# Patient Record
Sex: MALE | Race: OTHER | HISPANIC OR LATINO | URBAN - METROPOLITAN AREA
[De-identification: names, ages, dates, MRNs, and addresses within clinical notes are randomized per-mention and may not be internally consistent; named-entity substitution may affect disease eponyms.]

---

## 2022-06-02 ENCOUNTER — EMERGENCY (EMERGENCY)
Facility: HOSPITAL | Age: 42
LOS: 1 days | Discharge: HOME | End: 2022-06-02
Attending: EMERGENCY MEDICINE
Payer: SELF-PAY

## 2022-06-02 VITALS
WEIGHT: 212.08 LBS | HEART RATE: 73 BPM | RESPIRATION RATE: 18 BRPM | DIASTOLIC BLOOD PRESSURE: 79 MMHG | SYSTOLIC BLOOD PRESSURE: 128 MMHG | OXYGEN SATURATION: 97 % | TEMPERATURE: 98 F

## 2022-06-02 PROCEDURE — 99220: CPT

## 2022-06-02 PROCEDURE — 72131 CT LUMBAR SPINE W/O DYE: CPT | Mod: 26,MA

## 2022-06-02 RX ORDER — DIAZEPAM 5 MG
2 TABLET ORAL
Refills: 0 | Status: COMPLETED | OUTPATIENT
Start: 2022-06-02 | End: 2022-06-09

## 2022-06-02 RX ORDER — LIDOCAINE 4 G/100G
1 CREAM TOPICAL
Qty: 10 | Refills: 0
Start: 2022-06-02 | End: 2022-06-06

## 2022-06-02 RX ORDER — IBUPROFEN 200 MG
1 TABLET ORAL
Qty: 42 | Refills: 0
Start: 2022-06-02 | End: 2022-06-08

## 2022-06-02 RX ORDER — IBUPROFEN 200 MG
600 TABLET ORAL EVERY 6 HOURS
Refills: 0 | Status: DISCONTINUED | OUTPATIENT
Start: 2022-06-02 | End: 2022-06-06

## 2022-06-02 RX ORDER — METHOCARBAMOL 500 MG/1
1 TABLET, FILM COATED ORAL
Qty: 30 | Refills: 0
Start: 2022-06-02 | End: 2022-06-11

## 2022-06-02 RX ORDER — ACETAMINOPHEN 500 MG
2 TABLET ORAL
Qty: 120 | Refills: 0
Start: 2022-06-02 | End: 2022-06-11

## 2022-06-02 RX ORDER — CYCLOBENZAPRINE HYDROCHLORIDE 10 MG/1
10 TABLET, FILM COATED ORAL ONCE
Refills: 0 | Status: COMPLETED | OUTPATIENT
Start: 2022-06-02 | End: 2022-06-02

## 2022-06-02 RX ORDER — GABAPENTIN 400 MG/1
300 CAPSULE ORAL THREE TIMES A DAY
Refills: 0 | Status: DISCONTINUED | OUTPATIENT
Start: 2022-06-02 | End: 2022-06-06

## 2022-06-02 RX ORDER — LIDOCAINE 4 G/100G
1 CREAM TOPICAL ONCE
Refills: 0 | Status: COMPLETED | OUTPATIENT
Start: 2022-06-02 | End: 2022-06-02

## 2022-06-02 RX ORDER — LIDOCAINE 4 G/100G
1 CREAM TOPICAL EVERY 24 HOURS
Refills: 0 | Status: DISCONTINUED | OUTPATIENT
Start: 2022-06-02 | End: 2022-06-06

## 2022-06-02 RX ORDER — IBUPROFEN 200 MG
600 TABLET ORAL ONCE
Refills: 0 | Status: COMPLETED | OUTPATIENT
Start: 2022-06-02 | End: 2022-06-02

## 2022-06-02 RX ORDER — ACETAMINOPHEN 500 MG
650 TABLET ORAL EVERY 6 HOURS
Refills: 0 | Status: DISCONTINUED | OUTPATIENT
Start: 2022-06-02 | End: 2022-06-06

## 2022-06-02 RX ADMIN — LIDOCAINE 1 PATCH: 4 CREAM TOPICAL at 11:58

## 2022-06-02 RX ADMIN — GABAPENTIN 300 MILLIGRAM(S): 400 CAPSULE ORAL at 22:18

## 2022-06-02 RX ADMIN — CYCLOBENZAPRINE HYDROCHLORIDE 10 MILLIGRAM(S): 10 TABLET, FILM COATED ORAL at 11:59

## 2022-06-02 RX ADMIN — Medication 650 MILLIGRAM(S): at 23:32

## 2022-06-02 RX ADMIN — Medication 650 MILLIGRAM(S): at 22:18

## 2022-06-02 RX ADMIN — Medication 2 MILLIGRAM(S): at 18:21

## 2022-06-02 RX ADMIN — Medication 600 MILLIGRAM(S): at 11:59

## 2022-06-02 RX ADMIN — Medication 600 MILLIGRAM(S): at 18:56

## 2022-06-02 RX ADMIN — Medication 600 MILLIGRAM(S): at 18:21

## 2022-06-02 NOTE — ED PROVIDER NOTE - ATTENDING CONTRIBUTION TO CARE
41yom no sig pmh with back pain x 2 days since falling from 12 foot ladder. Drill gave out on him causing him to fall backwards, hitting ladder on the way down. did not have head injury. denies loc. not on blood thinners. pain is only in lumbar region of back. he denies bowel, bladder incontinence.  AP - ambulatory. tender in midline lumbar region. check CT, pain control. reassess

## 2022-06-02 NOTE — ED PROVIDER NOTE - CCCP TRG CHIEF CMPLNT
Patient reports headache 8/10. Tylenol ineffective. Dany Guerra NP notified. Order received for Ibuprofen 600mg once. fall from height

## 2022-06-02 NOTE — ED CDU PROVIDER INITIAL DAY NOTE - PROGRESS NOTE DETAILS
PT with acute spinal Fx  placed in obs for Diagnostic uncertainty. PT seen BY OBS PA found to be appropriate CDU PT care transferred to PA.

## 2022-06-02 NOTE — ED PROVIDER NOTE - NS ED ROS FT
Review of Systems  CONSTITUTIONAL: afebrile w/no diaphoresis or weight changes  SKIN: warm, dry w/ no rash or bleeding  EYES: no changes to vision  ENT: no changes in hearing, no sore throat  RESPIRATORY: no cough or SOB  CARDIAC: no chest pain & no palpitations  GI: no abd pain, nausea, vomiting, diarrhea, constipation, or blood in stool/carrie blood  GENITO-URINARY: no discharge, dysuria or hematuria,   MUSCULOSKELETAL:  RIGHT HIP AND RIGHT LOW BACK PAIN  NEUROLOGIC: no weakness, headache, anesthesia or paresthesias  PSYCH: no anxiety, non suicidal, non homicidal, without hallucinations or depression

## 2022-06-02 NOTE — ED PROVIDER NOTE - OBJECTIVE STATEMENT
HENRI CALDWELL is a 42yo ambulated in c/o BACK PAIN that started Tuesday after he fell from a 12 foot ladder. States he was at the top of the ladder drilling into the side of a house when the ladder slid out from underneath him and he fell on his back, landing on the ladder. He was able to get up on his own and was ambulatory at the scene but states he was in severe pain. He states the pain is getting worse and kept him up last night prompting him to come in. He says the pain is in his right low back and radiates to his right leg and is a/w numbness on the right lateral thigh. He denies bowel, bladder incontinence.

## 2022-06-02 NOTE — CONSULT NOTE ADULT - ASSESSMENT
ASSESSMENT  41M no PMH s/p mechanical fall off ladder, -LOC, HS, or AC/AP, found to have L2-L3 TP fractures.      PLAN  - case d/w team  - no acute neurosurgical intervention indicated at this time  - pain control as needed  - recommend MR L spine, brace/dispo. pending results  - will continue to follow ASSESSMENT  41M no PMH s/p mechanical fall off ladder, -LOC, HS, or AC/AP, found to have L2-L3 TP fractures.      PLAN  - case d/w team  - no acute neurosurgical intervention indicated at this time  - pain control as needed  - recommend MR L spine, brace/dispo. pending results  - recommend trauma eval.  - will continue to follow

## 2022-06-02 NOTE — ED CDU PROVIDER INITIAL DAY NOTE - MEDICAL DECISION MAKING DETAILS
41yom no sig pmh with back pain x 2 days since falling from 12 foot ladder. Drill gave out on him causing him to fall backwards, hitting ladder on the way down. did not have head injury. denies loc. not on blood thinners. pain is only in lumbar region of back. he denies bowel, bladder incontinence.  AP - ambulatory. tender in midline lumbar region. CT with transverse process fx in L2/L3, seen by spine/neurosurg plan for MR and obs

## 2022-06-02 NOTE — ED PROVIDER NOTE - CLINICAL SUMMARY MEDICAL DECISION MAKING FREE TEXT BOX
ASSESSMENT:   MARCELLE CALDWELL is a 40yo M who presented with BACK PAIN after falling from height. A/w anesthesias. No incontinence. PE w/ pain but full ROM    Concerning for msk vs radiculopathy.     PLAN: CT to r/o fracture, pain control, supportive care.     Medications:  cyclobenzaprine  lidocaine   4% Patch  ibuprofen  Tablet.    Studies:  CT Lumbar Spine No Cont

## 2022-06-02 NOTE — ED ADULT NURSE NOTE - OBJECTIVE STATEMENT
Pt comes complaining of back pain after falling off a ladder. Pt states he fell from 12 feet and landed on top of the ladder. Pt currently complaining of lower back pain, and is having difficulty standing and walking.

## 2022-06-02 NOTE — ED CDU PROVIDER INITIAL DAY NOTE - OBJECTIVE STATEMENT
HENRI CALDWELL is a 40yo ambulated in c/o BACK PAIN that started Tuesday after he fell from a 12 foot ladder. States he was at the top of the ladder drilling into the side of a house when the ladder slid out from underneath him and he fell on his back, landing on the ladder. He was able to get up on his own and was ambulatory at the scene but states he was in severe pain. He states the pain is getting worse and kept him up last night prompting him to come in. He says the pain is in his right low back and radiates to his right leg and is a/w numbness on the right lateral thigh. He denies bowel, bladder incontinence.

## 2022-06-02 NOTE — ED PROVIDER NOTE - PHYSICAL EXAMINATION
VITAL SIGNS: I have reviewed vital signs  CONSTITUTIONAL:  in no acute distress.  SKIN: Warm, dry, no rash.  HEAD: Normocephalic, atraumatic.  EYES: PERRL, conjunctiva and sclera clear.  ENT: pink & moist mucosa, no pharyngeal erythema  NECK: Supple, non tender. No cervical lymphadenopathy.  CARD: Regular rate and rhythm. No murmurs.   RESP: No wheezes, rales or rhonchi.   ABD:  soft, non-distended, non-tender.   MSK:  NO BONY DEFORMITIES IN SPINE, HIP, THIGH. TTP IN LEFT PARA SPINALS AND LEFT HIP. NO MIDLINE SPINAL TENDERNESS. FULL ROM IN LOW BACK, HIP BUT W/ PAIN. PT AMBULATES W/ PAIN.   NEURO: Alert, oriented. Grossly unremarkable. No focal deficits.   PSYCH: Cooperative, alert & oriented x3

## 2022-06-02 NOTE — CONSULT NOTE ADULT - SUBJECTIVE AND OBJECTIVE BOX
Patient is a 41y old Male who presents with a chief complaint of back pain.  HPI: 41M denies any PMH, presented to ED after falling off a 12 foot ladder on Tuesday. States he was drilling a hole and slipped off the ladder & then landed on his side on top of it. Denies head strike, LOC, AC/AP use. Patient tried taking Tylenol at home for pain without relief, prompting him to seek medical attention. Patient states the pain is localized to his lower back, on the left side, radiating down the buttocks to the lateral thigh, associated with numbness, denies tingling or weakness. Denies urinary/bowel incontinence or retention, saddle anaesthesia CT revealed L2/L3 transverse process fractures.    PAST MEDICAL & SURGICAL HISTORY: Denies    SOCIAL HISTORY:  Tobacco Use: Previous smoker- quit 1 month ago  EtOH use: Socially  Substance: Denies    Allergies  No Known Allergies    REVIEW OF SYSTEMS  Negative except as noted in HPI    HOME MEDICATIONS: None    Vital Signs Last 24 Hrs  T(C): 36.8 (02 Jun 2022 10:47), Max: 36.8 (02 Jun 2022 10:47)  T(F): 98.3 (02 Jun 2022 10:47), Max: 98.3 (02 Jun 2022 10:47)  HR: 73 (02 Jun 2022 10:47) (73 - 73)  BP: 128/79 (02 Jun 2022 10:47) (128/79 - 128/79)  BP(mean): --  RR: 18 (02 Jun 2022 10:47) (18 - 18)  SpO2: 97% (02 Jun 2022 10:47) (97% - 97%)    PHYSICAL EXAM:  GENERAL: NAD, well-groomed  HEAD:  Atraumatic, normocephalic  MENTAL STATUS: AAO x3; Awake. Opens eyes spontaneously. Appropriately conversant without aphasia, following simple commands.  CRANIAL NERVES: Visual acuity normal for age, visual fields full to confrontation, PERRL. EOMI without nystagmus. Facial sensation intact V1-3 distribution b/l. Face symmetric w/ normal eye closure and smile, tongue midline. Hearing grossly intact. Speech clear. Head turning and shoulder shrug intact.   MOTOR: b/l UE 5/5, RLE 5/5, LLE difficult to assess secondary to pain _ AG, HF 5/5, PF/DF 5/5  SENSATION: grossly intact to light touch all extremities    RADIOLOGY & ADDITIONAL STUDIES:  < from: CT Lumbar Spine No Cont (06.02.22 @ 14:34) >  IMPRESSION:  1.  Transitional lumbosacral vertebral anatomy. There are 6   square-shaped, lumbar type nonrib-bearing vertebral bodies. For the   purposes of this dictation, there is a lumbarized S1.  2.  Acute fractures of the left L2 and L3 transverse processes.    < end of copied text >      CAPRINI SCORE [CLOT]:  Patient has an estimated Caprini score of greater than 5.  However, the patient's unique clinical situation will be addressed in an individual manner to determine appropriate anticoagulation treatment, if any. Patient is a 41y old Male who presents with a chief complaint of back pain.  HPI: 41M denies any PMH, presented to ED after falling off a 12 foot ladder on Tuesday. States he was drilling a hole and slipped off the ladder & then landed on his side on top of it. Denies head strike, LOC, AC/AP use. Patient tried taking Tylenol at home for pain without relief, prompting him to seek medical attention. Patient states the pain is localized to his lower back, on the left side, radiating down the buttocks to the lateral thigh, associated with numbness, denies tingling or weakness. Also admits to L hip pain. Denies urinary/bowel incontinence or retention, saddle anaesthesia CT revealed L2/L3 transverse process fractures.    PAST MEDICAL & SURGICAL HISTORY: Denies    SOCIAL HISTORY:  Tobacco Use: Previous smoker- quit 1 month ago  EtOH use: Socially  Substance: Denies    Allergies  No Known Allergies    REVIEW OF SYSTEMS  Negative except as noted in HPI    HOME MEDICATIONS: None    Vital Signs Last 24 Hrs  T(C): 36.8 (02 Jun 2022 10:47), Max: 36.8 (02 Jun 2022 10:47)  T(F): 98.3 (02 Jun 2022 10:47), Max: 98.3 (02 Jun 2022 10:47)  HR: 73 (02 Jun 2022 10:47) (73 - 73)  BP: 128/79 (02 Jun 2022 10:47) (128/79 - 128/79)  BP(mean): --  RR: 18 (02 Jun 2022 10:47) (18 - 18)  SpO2: 97% (02 Jun 2022 10:47) (97% - 97%)    PHYSICAL EXAM:  GENERAL: NAD, well-groomed  HEAD:  Atraumatic, normocephalic  MENTAL STATUS: AAO x3; Awake. Opens eyes spontaneously. Appropriately conversant without aphasia, following simple commands.  CRANIAL NERVES: Visual acuity normal for age, visual fields full to confrontation, PERRL. EOMI without nystagmus. Facial sensation intact V1-3 distribution b/l. Face symmetric w/ normal eye closure and smile, tongue midline. Hearing grossly intact. Speech clear. Head turning and shoulder shrug intact.   MOTOR: b/l UE 5/5, RLE 5/5, LLE difficult to assess secondary to pain _ AG, HF 5/5, PF/DF 5/5  SENSATION: grossly intact to light touch all extremities    RADIOLOGY & ADDITIONAL STUDIES:  < from: CT Lumbar Spine No Cont (06.02.22 @ 14:34) >  IMPRESSION:  1.  Transitional lumbosacral vertebral anatomy. There are 6   square-shaped, lumbar type nonrib-bearing vertebral bodies. For the   purposes of this dictation, there is a lumbarized S1.  2.  Acute fractures of the left L2 and L3 transverse processes.    < end of copied text >      CAPRINI SCORE [CLOT]:  Patient has an estimated Caprini score of greater than 5.  However, the patient's unique clinical situation will be addressed in an individual manner to determine appropriate anticoagulation treatment, if any.

## 2022-06-03 LAB
ALBUMIN SERPL ELPH-MCNC: 4.6 G/DL — SIGNIFICANT CHANGE UP (ref 3.3–5.2)
ALP SERPL-CCNC: 77 U/L — SIGNIFICANT CHANGE UP (ref 40–120)
ALT FLD-CCNC: 61 U/L — HIGH
ANION GAP SERPL CALC-SCNC: 11 MMOL/L — SIGNIFICANT CHANGE UP (ref 5–17)
APTT BLD: 33.3 SEC — SIGNIFICANT CHANGE UP (ref 27.5–35.5)
AST SERPL-CCNC: 49 U/L — HIGH
BASOPHILS # BLD AUTO: 0.02 K/UL — SIGNIFICANT CHANGE UP (ref 0–0.2)
BASOPHILS NFR BLD AUTO: 0.4 % — SIGNIFICANT CHANGE UP (ref 0–2)
BILIRUB SERPL-MCNC: 0.5 MG/DL — SIGNIFICANT CHANGE UP (ref 0.4–2)
BUN SERPL-MCNC: 17.2 MG/DL — SIGNIFICANT CHANGE UP (ref 8–20)
CALCIUM SERPL-MCNC: 9.8 MG/DL — SIGNIFICANT CHANGE UP (ref 8.6–10.2)
CHLORIDE SERPL-SCNC: 102 MMOL/L — SIGNIFICANT CHANGE UP (ref 98–107)
CO2 SERPL-SCNC: 27 MMOL/L — SIGNIFICANT CHANGE UP (ref 22–29)
CREAT SERPL-MCNC: 1.03 MG/DL — SIGNIFICANT CHANGE UP (ref 0.5–1.3)
EGFR: 94 ML/MIN/1.73M2 — SIGNIFICANT CHANGE UP
EOSINOPHIL # BLD AUTO: 0.12 K/UL — SIGNIFICANT CHANGE UP (ref 0–0.5)
EOSINOPHIL NFR BLD AUTO: 2.5 % — SIGNIFICANT CHANGE UP (ref 0–6)
GLUCOSE SERPL-MCNC: 135 MG/DL — HIGH (ref 70–99)
HCT VFR BLD CALC: 44 % — SIGNIFICANT CHANGE UP (ref 39–50)
HGB BLD-MCNC: 14.7 G/DL — SIGNIFICANT CHANGE UP (ref 13–17)
IMM GRANULOCYTES NFR BLD AUTO: 0.2 % — SIGNIFICANT CHANGE UP (ref 0–1.5)
INR BLD: 1.01 RATIO — SIGNIFICANT CHANGE UP (ref 0.88–1.16)
LYMPHOCYTES # BLD AUTO: 2.04 K/UL — SIGNIFICANT CHANGE UP (ref 1–3.3)
LYMPHOCYTES # BLD AUTO: 41.9 % — SIGNIFICANT CHANGE UP (ref 13–44)
MCHC RBC-ENTMCNC: 30.7 PG — SIGNIFICANT CHANGE UP (ref 27–34)
MCHC RBC-ENTMCNC: 33.4 GM/DL — SIGNIFICANT CHANGE UP (ref 32–36)
MCV RBC AUTO: 91.9 FL — SIGNIFICANT CHANGE UP (ref 80–100)
MONOCYTES # BLD AUTO: 0.31 K/UL — SIGNIFICANT CHANGE UP (ref 0–0.9)
MONOCYTES NFR BLD AUTO: 6.4 % — SIGNIFICANT CHANGE UP (ref 2–14)
NEUTROPHILS # BLD AUTO: 2.37 K/UL — SIGNIFICANT CHANGE UP (ref 1.8–7.4)
NEUTROPHILS NFR BLD AUTO: 48.6 % — SIGNIFICANT CHANGE UP (ref 43–77)
PLATELET # BLD AUTO: 232 K/UL — SIGNIFICANT CHANGE UP (ref 150–400)
POTASSIUM SERPL-MCNC: 4.2 MMOL/L — SIGNIFICANT CHANGE UP (ref 3.5–5.3)
POTASSIUM SERPL-SCNC: 4.2 MMOL/L — SIGNIFICANT CHANGE UP (ref 3.5–5.3)
PROT SERPL-MCNC: 7.7 G/DL — SIGNIFICANT CHANGE UP (ref 6.6–8.7)
PROTHROM AB SERPL-ACNC: 11.7 SEC — SIGNIFICANT CHANGE UP (ref 10.5–13.4)
RBC # BLD: 4.79 M/UL — SIGNIFICANT CHANGE UP (ref 4.2–5.8)
RBC # FLD: 13.2 % — SIGNIFICANT CHANGE UP (ref 10.3–14.5)
SARS-COV-2 RNA SPEC QL NAA+PROBE: SIGNIFICANT CHANGE UP
SODIUM SERPL-SCNC: 140 MMOL/L — SIGNIFICANT CHANGE UP (ref 135–145)
WBC # BLD: 4.87 K/UL — SIGNIFICANT CHANGE UP (ref 3.8–10.5)
WBC # FLD AUTO: 4.87 K/UL — SIGNIFICANT CHANGE UP (ref 3.8–10.5)

## 2022-06-03 PROCEDURE — 72148 MRI LUMBAR SPINE W/O DYE: CPT | Mod: 26,MA

## 2022-06-03 PROCEDURE — 99220: CPT

## 2022-06-03 PROCEDURE — 99226: CPT

## 2022-06-03 RX ADMIN — LIDOCAINE 1 PATCH: 4 CREAM TOPICAL at 16:56

## 2022-06-03 RX ADMIN — Medication 600 MILLIGRAM(S): at 17:29

## 2022-06-03 RX ADMIN — Medication 600 MILLIGRAM(S): at 09:43

## 2022-06-03 RX ADMIN — Medication 600 MILLIGRAM(S): at 19:26

## 2022-06-03 RX ADMIN — Medication 2 MILLIGRAM(S): at 17:29

## 2022-06-03 RX ADMIN — Medication 650 MILLIGRAM(S): at 15:50

## 2022-06-03 RX ADMIN — GABAPENTIN 300 MILLIGRAM(S): 400 CAPSULE ORAL at 13:47

## 2022-06-03 RX ADMIN — Medication 650 MILLIGRAM(S): at 05:35

## 2022-06-03 RX ADMIN — LIDOCAINE 1 PATCH: 4 CREAM TOPICAL at 05:34

## 2022-06-03 RX ADMIN — Medication 600 MILLIGRAM(S): at 05:35

## 2022-06-03 RX ADMIN — LIDOCAINE 1 PATCH: 4 CREAM TOPICAL at 09:43

## 2022-06-03 RX ADMIN — Medication 650 MILLIGRAM(S): at 16:55

## 2022-06-03 RX ADMIN — Medication 2 MILLIGRAM(S): at 05:35

## 2022-06-03 RX ADMIN — Medication 650 MILLIGRAM(S): at 09:43

## 2022-06-03 RX ADMIN — Medication 600 MILLIGRAM(S): at 12:15

## 2022-06-03 RX ADMIN — Medication 600 MILLIGRAM(S): at 15:25

## 2022-06-03 RX ADMIN — GABAPENTIN 300 MILLIGRAM(S): 400 CAPSULE ORAL at 23:02

## 2022-06-03 RX ADMIN — GABAPENTIN 300 MILLIGRAM(S): 400 CAPSULE ORAL at 05:35

## 2022-06-03 NOTE — PROGRESS NOTE ADULT - SUBJECTIVE AND OBJECTIVE BOX
INTERVAL HPI/OVERNIGHT EVENTS:  40 y/o male w/ no known PMHx, presents to [facility] s/p fall from 12 foot ladder, + c/o lower back pain, CT revealed L2/L3 transverse process fractures, NSG called to evaluate. Pt seen and evaluated this morning while sitting on side in bed.       Vital Signs Last 24 Hrs  T(C): 36.6 (03 Jun 2022 05:39), Max: 36.9 (02 Jun 2022 19:47)  T(F): 97.8 (03 Jun 2022 05:39), Max: 98.5 (02 Jun 2022 19:47)  HR: 64 (03 Jun 2022 05:39) (52 - 76)  BP: 112/64 (03 Jun 2022 05:39) (108/62 - 147/79)  BP(mean): --  RR: 18 (03 Jun 2022 05:39) (18 - 18)  SpO2: 98% (03 Jun 2022 05:39) (97% - 98%)      PHYSICAL EXAM:  GENERAL: NAD, well-groomed, well-developed  HEAD:  Atraumatic, normocephalic  DRAINS:   WOUND: Dressing clean dry intact  MENTAL STATUS: AAO x3; Awake/Comatose; Opens eyes spontaneously/to voice/to light touch/to noxious stimuli; Appropriately conversant without aphasia/Nonverbal; following simple commands/mimicking/not following commands  CRANIAL NERVES: PERRL; EOMI; corneals intact b/l; Dolls sign positive; blinks to threat b/l; no facial asymmetry; facial sensation grossly intact to light touch b/l;  tongue midline; palate rises symmetrically; gag reflex intact  MOTOR: strength 5/5 B/L upper and lower extremities; sensation grossly intact all extremities; DTRs 2+ intact and symmetric; negative Pitts's b/l; negative clonus b/l  CHEST/LUNG: Clear to auscultation bilaterally; no rales, rhonchi, wheezing, or rubs  HEART: +S1/+S2; Regular rate and rhythm; no murmurs, rubs, or gallops  ABDOMEN: Soft, nontender, nondistended; bowel sounds present all four quadrants  EXTREMITIES:  2+ peripheral pulses, no clubbing, cyanosis, or edema  SKIN: Warm, dry; no rashes or lesions      RADIOLOGY & ADDITIONAL TESTS:   INTERVAL HPI/OVERNIGHT EVENTS:  40 y/o male w/ no known PMHx, presents to [facility] s/p fall from 12 foot ladder, + c/o lower back pain, CT revealed L2/L3 transverse process fractures, NSG called to evaluate. Pt seen and evaluated this morning while sitting on side in bed.   +lower back pain to tenderness      Vital Signs Last 24 Hrs  T(C): 36.6 (03 Jun 2022 05:39), Max: 36.9 (02 Jun 2022 19:47)  T(F): 97.8 (03 Jun 2022 05:39), Max: 98.5 (02 Jun 2022 19:47)  HR: 64 (03 Jun 2022 05:39) (52 - 76)  BP: 112/64 (03 Jun 2022 05:39) (108/62 - 147/79)  BP(mean): --  RR: 18 (03 Jun 2022 05:39) (18 - 18)  SpO2: 98% (03 Jun 2022 05:39) (97% - 98%)      PHYSICAL EXAM:  GENERAL: NAD, well-groomed  HEAD:  Atraumatic, normocephalic  MENTAL STATUS: AAO x3; Awake; Opens eyes spontaneously; Appropriately conversant without aphasia; following simple commands  CRANIAL NERVES: SHAHRAM; EOMI; corneals intact b/l; Dolls sign positive; blinks to threat b/l; no facial asymmetry; facial sensation grossly intact to light touch b/l;  tongue midline; palate rises symmetrically  MOTOR: strength 5/5 B/L upper and lower extremities, limited by back pain; sensation grossly intact all extremities  ABDOMEN: Soft, nontender, nondistended; bowel sounds present  EXTREMITIES: no clubbing, cyanosis  SKIN: Warm, dry; no rashes or lesions      RADIOLOGY & ADDITIONAL TESTS:      ACC: [id number] EXAM:  CT LUMBAR SPINE                        PROCEDURE DATE:  06/02/2022    IMPRESSION:  1.  Transitional lumbosacral vertebral anatomy. There are 6   square-shaped, lumbar type nonrib-bearing vertebral bodies. For the   purposes of this dictation, there is a lumbarized S1.  2.  Acute fractures of the left L2 and L3 transverse processes.

## 2022-06-03 NOTE — ED CDU PROVIDER SUBSEQUENT DAY NOTE - ATTENDING CONTRIBUTION TO CARE
42yo male found to have lumbar fracture s/p mechanical fall off ladder, neuro intact, pending NSG eval, MRI L-spine, pain control. Iwona Navarrete DO

## 2022-06-03 NOTE — ED CDU PROVIDER SUBSEQUENT DAY NOTE - NS ED ATTENDING STATEMENT MOD
I have seen and examined this patient and fully participated in the care of this patient as the teaching attending.  The service was shared with the JENNA.  I reviewed and verified the documentation and independently performed the documented:

## 2022-06-03 NOTE — PROGRESS NOTE ADULT - ASSESSMENT
40 y/o male w/ no known PMHx, presents to [facility] s/p fall from 12 foot ladder, + c/o lower back pain, CT revealed L2/L3 transverse process fractures.    -Case discussed w/ Dr. Loo  -pain control as needed, avoid over sedation  -Pt pending MRI, further recommendations after test  -will continue to follow

## 2022-06-03 NOTE — ED ADULT NURSE REASSESSMENT NOTE - COMFORT CARE
ambulated to bathroom/meal provided/plan of care explained/po fluids offered/repositioned/wait time explained

## 2022-06-03 NOTE — ED ADULT NURSE REASSESSMENT NOTE - SYMPTOMS
mid to lower back pain and numbness, radiating to left anterior thigh/pain:
lower back pain to mid back/pain:
mid back radiating down to lower back and left thigh/pain:

## 2022-06-04 VITALS
OXYGEN SATURATION: 98 % | DIASTOLIC BLOOD PRESSURE: 73 MMHG | RESPIRATION RATE: 18 BRPM | HEART RATE: 73 BPM | TEMPERATURE: 99 F | SYSTOLIC BLOOD PRESSURE: 116 MMHG

## 2022-06-04 PROCEDURE — 80053 COMPREHEN METABOLIC PANEL: CPT

## 2022-06-04 PROCEDURE — U0003: CPT

## 2022-06-04 PROCEDURE — 99284 EMERGENCY DEPT VISIT MOD MDM: CPT | Mod: 25

## 2022-06-04 PROCEDURE — U0005: CPT

## 2022-06-04 PROCEDURE — 96374 THER/PROPH/DIAG INJ IV PUSH: CPT

## 2022-06-04 PROCEDURE — 85730 THROMBOPLASTIN TIME PARTIAL: CPT

## 2022-06-04 PROCEDURE — G0378: CPT

## 2022-06-04 PROCEDURE — 99217: CPT

## 2022-06-04 PROCEDURE — 85610 PROTHROMBIN TIME: CPT

## 2022-06-04 PROCEDURE — 72131 CT LUMBAR SPINE W/O DYE: CPT | Mod: MA

## 2022-06-04 PROCEDURE — 36415 COLL VENOUS BLD VENIPUNCTURE: CPT

## 2022-06-04 PROCEDURE — 72148 MRI LUMBAR SPINE W/O DYE: CPT | Mod: MA

## 2022-06-04 PROCEDURE — 85025 COMPLETE CBC W/AUTO DIFF WBC: CPT

## 2022-06-04 RX ORDER — MORPHINE SULFATE 50 MG/1
4 CAPSULE, EXTENDED RELEASE ORAL ONCE
Refills: 0 | Status: DISCONTINUED | OUTPATIENT
Start: 2022-06-04 | End: 2022-06-04

## 2022-06-04 RX ADMIN — GABAPENTIN 300 MILLIGRAM(S): 400 CAPSULE ORAL at 05:07

## 2022-06-04 RX ADMIN — LIDOCAINE 1 PATCH: 4 CREAM TOPICAL at 05:08

## 2022-06-04 RX ADMIN — MORPHINE SULFATE 4 MILLIGRAM(S): 50 CAPSULE, EXTENDED RELEASE ORAL at 05:34

## 2022-06-04 RX ADMIN — Medication 600 MILLIGRAM(S): at 05:07

## 2022-06-04 RX ADMIN — MORPHINE SULFATE 4 MILLIGRAM(S): 50 CAPSULE, EXTENDED RELEASE ORAL at 03:19

## 2022-06-04 RX ADMIN — Medication 600 MILLIGRAM(S): at 11:48

## 2022-06-04 RX ADMIN — Medication 650 MILLIGRAM(S): at 05:07

## 2022-06-04 RX ADMIN — Medication 2 MILLIGRAM(S): at 05:07

## 2022-06-04 RX ADMIN — LIDOCAINE 1 PATCH: 4 CREAM TOPICAL at 05:34

## 2022-06-04 NOTE — PROGRESS NOTE ADULT - SUBJECTIVE AND OBJECTIVE BOX
HPI:  41M denies any PMH, presented to ED after falling off a 12 foot ladder on Tuesday. States he was drilling a hole and slipped off the ladder & then landed on his side on top of it. Denies head strike, LOC, AC/AP use. Patient tried taking Tylenol at home for pain without relief, prompting him to seek medical attention. Patient states the pain is localized to his lower back, on the left side, radiating down the buttocks to the lateral thigh, associated with numbness, denies tingling or weakness. Also admits to L hip pain. Denies urinary/bowel incontinence or retention, saddle anaesthesia CT revealed L2/L3 transverse process fractures.    Interval history: patient c/o LBP radiating to his Left LE with burning sensation. denies B/B incontinence.     PHYSICAL EXAM:  GENERAL: NAD, well-groomed  HEAD:  Atraumatic, normocephalic  MENTAL STATUS: AAO x3; speech is clear, face symmetric  CRANIAL NERVES: SHAHRAM; EOMI; tongue midline  MOTOR: UE/LE 5/5, + SLR at 30degrees on the Left  Sensory: grossly intact to light touch  ABDOMEN: Soft, nontender, nondistended; bowel sounds present  EXTREMITIES: no clubbing, cyanosis  SKIN: Warm, dry; no rashes or lesions    Vital Signs Last 24 Hrs  T(C): 36.4 (04 Jun 2022 07:56), Max: 36.8 (03 Jun 2022 23:40)  T(F): 97.6 (04 Jun 2022 07:56), Max: 98.3 (03 Jun 2022 23:40)  HR: 62 (04 Jun 2022 07:56) (61 - 69)  BP: 111/72 (04 Jun 2022 07:56) (111/72 - 166/77)  RR: 18 (04 Jun 2022 07:56) (18 - 18)  SpO2: 97% (04 Jun 2022 07:56) (95% - 98%)    Labs:                         14.7   4.87  )-----------( 232      ( 03 Jun 2022 10:02 )             44.0     140  |  102  |  17.2  ----------------------------<  135<H>  4.2   |  27.0  |  1.03    Ca    9.8      03 Jun 2022 10:02    TPro  7.7  /  Alb  4.6  /  TBili  0.5  /  DBili  x   /  AST  49<H>  /  ALT  61<H>  /  AlkPhos  77  06-03      LIVER FUNCTIONS - ( 03 Jun 2022 10:02 )  Alb: 4.6 g/dL / Pro: 7.7 g/dL / ALK PHOS: 77 U/L / ALT: 61 U/L / AST: 49 U/L / GGT: x         PT/INR - ( 03 Jun 2022 10:02 )   PT: 11.7 sec;   INR: 1.01 ratio     PTT - ( 03 Jun 2022 10:02 )  PTT:33.3 sec    Neuro Imaging  EXAM:  CT LUMBAR SPINE                        PROCEDURE DATE:  06/02/2022    IMPRESSION:  1.  Transitional lumbosacral vertebral anatomy. There are 6   square-shaped, lumbar type nonrib-bearing vertebral bodies. For the   purposes of this dictation, there is a lumbarized S1.  2.  Acute fractures of the left L2 and L3 transverse processes.    MR Lumbar Spine No Cont (06.03.22 @ 20:23) >  IMPRESSION:  Mild degenerative changes, most notable at L5-S1.    Final report: Left-sided transverse process fractures at L2 and L3 are   better identified on the prior CT CT obtained at 6/2/2022.    No intraspinal hemorrhage

## 2022-06-04 NOTE — ED CDU PROVIDER SUBSEQUENT DAY NOTE - MEDICAL DECISION MAKING DETAILS
41yom no sig pmh with back pain x 2 days since falling from 12 foot ladder. Drill gave out on him causing him to fall backwards, hitting ladder on the way down. did not have head injury. denies loc. not on blood thinners. pain is only in lumbar region of back. he denies bowel, bladder incontinence.  - MR lumbar spine - preliminary report shows degenerative changes  - neurosurgery consult  - pain control
41yom no sig pmh with back pain x 2 days since falling from 12 foot ladder. Drill gave out on him causing him to fall backwards, hitting ladder on the way down. did not have head injury. denies loc. not on blood thinners. pain is only in lumbar region of back. he denies bowel, bladder incontinence.  - MR lumbar   - neurosurg  - pain control

## 2022-06-04 NOTE — ED ADULT NURSE REASSESSMENT NOTE - NS ED NURSE REASSESS COMMENT FT1
Assumed care of patient at this time. Patient AxOx4, patient resting in bed watching TV. Safety maintained. Patient offers no complaints at this time.
Neuro remains intact. Sensation intact. Full ROM to B/L LE and UE. Medicated with ibuprofen for back pain. Pending MRI
Pt remains A&Ox4, no distress noted at this time. Pt to be placed in Observation for MRI pending. Pt verbalizes relief with pain meds. Will continue to monitor.
Assumed care of the patient at 0730. Verbal report received from Radha DISLA ED. Patient A&Ox4. No s/s of acute distress. States mid back pain radiating to lower back and left thigh persists, worse with ambulation. Voiding in bathroom without difficulty. Sensation intact, Full ROM+ to all extremities. Some numbness to lower back and left thigh persists. Steady agit. Patient pending MRI testing and neurosx consult. Patient in understanding of plan of care. Patient with no further questions for the RN. Resting in comfort. Call bell within reach and encouraged to use when assistance needed. Will continue to monitor.
Neuro intact, pending MRI.
assumed care of pt @ 0730, report received from night RN. pt is AOx4 in NAD, pt endorses mild pain at this time, but does not want pain medications at this time. respirations even and unlabored. denies chest discomfort or SOB. abd soft and nondistended. no neuro deficits noted. safety maintained, call bell in reach.
pt due for Q4 Neurocheck.  repeat NIH 0.  pt resting comfortably in bed, no acute distress noted at this time safety maintained. Pt encouraged to report any needs or changes to staff.

## 2022-06-04 NOTE — ED CDU PROVIDER DISPOSITION NOTE - CLINICAL COURSE
pt sustained transverse fractures of L2/L3, CT and MRI - no cord compression , cleared by neurosurgery

## 2022-06-04 NOTE — PHYSICAL THERAPY INITIAL EVALUATION ADULT - PREDICTED DURATION OF THERAPY (DAYS/WKS), PT EVAL
Pt is functionally independent, no further PT services are indicated, PT will sign off at this time.

## 2022-06-04 NOTE — ED CDU PROVIDER SUBSEQUENT DAY NOTE - NS ED ATTENDING STATEMENT MOD
This was a shared visit with the JENNA. I reviewed and verified the documentation and independently performed the documented:

## 2022-06-04 NOTE — PHYSICAL THERAPY INITIAL EVALUATION ADULT - PHYSICAL ASSIST/NONPHYSICAL ASSIST, REHAB EVAL
verbal cues for proper rolling technique + pt required verbal cues re spinal precautions/1 person assist

## 2022-06-04 NOTE — ED CDU PROVIDER SUBSEQUENT DAY NOTE - ATTENDING APP SHARED VISIT CONTRIBUTION OF CARE
I agree with the PA's note and was available for any issues/concerns. I was directly involved in patient care. My brief overall assessment is as follows:    pain mostly controlled; neurosurgery assessment pending

## 2022-06-04 NOTE — PHYSICAL THERAPY INITIAL EVALUATION ADULT - PERTINENT HX OF CURRENT PROBLEM, REHAB EVAL
pt pt came into hospital s/p fall off ladder 1-2 days PTA. pt was found to have an acute L2-3 TP fx.

## 2022-06-04 NOTE — ED CDU PROVIDER SUBSEQUENT DAY NOTE - HISTORY
The products and items listed below (the “Products”)  and their claims have not been evaluated by the Food and Drug Administration. Dietary products are not intended to treat, prevent, mitigate or cure disease.  Ultimately, you must draw your own conclusion
pt well appearing in NAD overnight., no acute events or complaints
Pt mostly comfortable overnight, had one episode of increased pain improved w meds  MRI preliminary report shows degenerative changes  Pending neurosurgery re-evaluation today

## 2022-06-04 NOTE — ED ADULT NURSE REASSESSMENT NOTE - NURSING NEURO LEVEL OF CONSCIOUSNESS
alert and awake/follows commands
alert and awake

## 2022-06-04 NOTE — ED CDU PROVIDER SUBSEQUENT DAY NOTE - PHYSICAL EXAMINATION
VITAL SIGNS: I have reviewed vital signs  CONSTITUTIONAL:  in no acute distress.  SKIN: Warm, dry, no rash.  HEAD: Normocephalic, atraumatic.  EYES: PERRL, conjunctiva and sclera clear.  ENT: pink & moist mucosa, no pharyngeal erythema  NECK: Supple, non tender. No cervical lymphadenopathy.  CARD: Regular rate and rhythm. No murmurs.   RESP: No wheezes, rales or rhonchi.   ABD:  soft, non-distended, non-tender.   MSK:  NO BONY DEFORMITIES IN SPINE, HIP, THIGH. TTP IN LEFT PARA SPINALS AND LEFT HIP. NO MIDLINE SPINAL TENDERNESS. FULL ROM IN LOW BACK, HIP BUT W/ PAIN. PT AMBULATES W/ PAIN. + left hip pain   NEURO: Alert, oriented. Grossly unremarkable. No focal deficits.   PSYCH: Cooperative, alert & oriented x3
VITAL SIGNS: I have reviewed vital signs  CONSTITUTIONAL:  in no acute distress.  SKIN: Warm, dry, no rash.  HEAD: Normocephalic, atraumatic.  EYES: PERRL, conjunctiva and sclera clear.  ENT: pink & moist mucosa, no pharyngeal erythema  NECK: Supple, non tender. No cervical lymphadenopathy.  CARD: Regular rate and rhythm. No murmurs.   RESP: No wheezes, rales or rhonchi.   ABD:  soft, non-distended, non-tender.   MSK:  NO BONY DEFORMITIES IN SPINE, HIP, THIGH. TTP IN LEFT PARA SPINALS AND LEFT HIP. NO MIDLINE SPINAL TENDERNESS. FULL ROM IN LOW BACK, HIP BUT W/ PAIN. PT AMBULATES W/ PAIN. + left hip pain   NEURO: Alert, oriented. Grossly unremarkable. No focal deficits.   PSYCH: Cooperative, alert & oriented x3

## 2022-06-04 NOTE — PROGRESS NOTE ADULT - ASSESSMENT
Assessment:  42 y/o male w/ no known PMHx, presents to [facility] s/p fall from 12 foot ladder, + c/o lower back pain, CT revealed L2/L3 transverse process fractures.    Plan  - pain control as needed  - PT eval  - Plan after morning rounds & MRI review    Assessment:  40 y/o male w/ no known PMHx, presents to [facility] s/p fall from 12 foot ladder, + c/o lower back pain, CT revealed L2/L3 transverse process fractures.    Plan  - pain control as needed  - Imaging reviewed, no NSG intervention   - ok to follow up in the office with Dr Loo  - We will sign off, reconsult as needed

## 2022-06-04 NOTE — ED CDU PROVIDER SUBSEQUENT DAY NOTE - NSICDXNOPASTMEDICALHX_GEN_ALL_ED
<-- Click to add NO pertinent Past Medical History
<-- Click to add NO pertinent Past Medical History
negative...

## 2022-06-04 NOTE — PROVIDER CONTACT NOTE (OTHER) - BACKGROUND
pt was brought into hospital with c/o LBP s/p fall off ladder 1-2 days PTA, sustaining L2-3 TP fx. Neurosx stated no LSO is needed at this time

## 2022-06-04 NOTE — ED CDU PROVIDER DISPOSITION NOTE - CARE PROVIDER_API CALL
Junaid Alejandro)  Neurosurgery  [address], [address]  [place] [address]  Phone: [phone]  Fax: [phone]  Follow Up Time:

## 2022-06-04 NOTE — PHYSICAL THERAPY INITIAL EVALUATION ADULT - GENERAL OBSERVATIONS, REHAB EVAL
Pt received on XX, pt ok for PT by RN XX. pt observed semi-menon in bed with XX, pleasant, cooperative, A&O and c/o XX/10 pain t/o eval. Pt received in ED, pt ok for PT by NAIF Browning. spoke with neurosx team who reviewed MRI results, stated that LSO is not needed at this time. pt observed semi-menon in bed with IV lock, pleasant, cooperative, A&O and c/o LBP (left sided) with mvmt, unable to quantify.

## 2022-06-04 NOTE — PHYSICAL THERAPY INITIAL EVALUATION ADULT - ADDITIONAL COMMENTS
Pt lives with spouse + children in a private [place] ranch home with 5 JESUS with handrails + 5 steps with handrails to upper main level/bed&bath. Pt's PLOF was independent in all ADLs/ambulation without an Assistive Device and (+) driving PTA. Pt has no DME at home.

## 2022-06-04 NOTE — PROVIDER CONTACT NOTE (OTHER) - ASSESSMENT
LEATHA x 1 for bed mobility 2/2 pain however, was functionally independent in transfers/gait/stairs with use of SAC. see complete PT eval for further information

## 2022-06-04 NOTE — ED CDU PROVIDER SUBSEQUENT DAY NOTE - NS ED ROS FT
Review of Systems  CONSTITUTIONAL: afebrile w/no diaphoresis or weight changes  SKIN: warm, dry w/ no rash or bleeding  EYES: no changes to vision  ENT: no changes in hearing, no sore throat  RESPIRATORY: no cough or SOB  CARDIAC: no chest pain & no palpitations  GI: no abd pain, nausea, vomiting, diarrhea, constipation, or blood in stool/carrie blood  GENITO-URINARY: no discharge, dysuria or hematuria,   MUSCULOSKELETAL:  RIGHT HIP AND left LOW BACK PAIN  NEUROLOGIC: no weakness, headache, anesthesia or paresthesias  PSYCH: no anxiety, non suicidal, non homicidal, without hallucinations or depression
Review of Systems  CONSTITUTIONAL: afebrile w/no diaphoresis or weight changes  SKIN: warm, dry w/ no rash or bleeding  EYES: no changes to vision  ENT: no changes in hearing, no sore throat  RESPIRATORY: no cough or SOB  CARDIAC: no chest pain & no palpitations  GI: no abd pain, nausea, vomiting, diarrhea, constipation, or blood in stool/carrie blood  GENITO-URINARY: no discharge, dysuria or hematuria,   MUSCULOSKELETAL:  RIGHT HIP AND left LOW BACK PAIN  NEUROLOGIC: no weakness, headache, anesthesia or paresthesias  PSYCH: no anxiety, non suicidal, non homicidal, without hallucinations or depression

## 2022-06-04 NOTE — ED CDU PROVIDER DISPOSITION NOTE - PATIENT PORTAL LINK FT
You can access the FollowMyHealth Patient Portal offered by [facility] by registering at the following website: [email or web address]. By joining [employer]’s FollowMyHealth portal, you will also be able to view your health information using other applications (apps) compatible with our system.

## 2022-06-04 NOTE — ED CDU PROVIDER DISPOSITION NOTE - ATTENDING CONTRIBUTION TO CARE
I agree with the PA's note and was available for any issues/concerns. I was directly involved in patient care. My brief overall assessment is as follows:    labs and diagnostic imaging results reviewed with patient; neurosurgery assessment noted; patient feels comfortable with discharge and medical plan; PMD or clinic follow up recommended for reassessment. Patient is aware of signs/symptoms to return to the emergency department.

## 2022-06-04 NOTE — PHYSICAL THERAPY INITIAL EVALUATION ADULT - PHYSICAL ASSIST/NONPHYSICAL ASSIST: GAIT, REHAB EVAL
SBA without use of Assistive Device however, very guarded/apprehensive/decreased step & stride length, independent with use of SAC with improved gait pattern. verbal cues and demonstration on proper use and sequencing with SAC

## 2022-06-04 NOTE — PROVIDER CONTACT NOTE (OTHER) - SITUATION
PT eval unable to be completed. Awaiting MRI and determination for bracing secondary to acute L2-L3 fx. Will follow up post MRI.
chart reviewed, contents noted, orders received and eval completed. pt received/returned in ED, in NAD and cleared for PT by NAIF Browning

## 2022-06-29 ENCOUNTER — APPOINTMENT (OUTPATIENT)
Dept: NEUROSURGERY | Facility: CLINIC | Age: 42
End: 2022-06-29

## 2022-06-29 VITALS
BODY MASS INDEX: 33.27 KG/M2 | OXYGEN SATURATION: 98 % | HEIGHT: 67 IN | TEMPERATURE: 97.8 F | SYSTOLIC BLOOD PRESSURE: 127 MMHG | DIASTOLIC BLOOD PRESSURE: 81 MMHG | WEIGHT: 212 LBS | HEART RATE: 92 BPM

## 2022-06-29 DIAGNOSIS — S32.009A UNSPECIFIED FRACTURE OF UNSPECIFIED LUMBAR VERTEBRA, INITIAL ENCOUNTER FOR CLOSED FRACTURE: ICD-10-CM

## 2022-06-29 PROBLEM — Z00.00 ENCOUNTER FOR PREVENTIVE HEALTH EXAMINATION: Status: ACTIVE | Noted: 2022-06-29

## 2022-06-29 PROCEDURE — 99214 OFFICE O/P EST MOD 30 MIN: CPT

## 2022-06-30 PROBLEM — S32.009A LUMBAR TRANSVERSE PROCESS FRACTURE: Status: ACTIVE | Noted: 2022-06-30

## 2022-06-30 RX ORDER — IBUPROFEN 400 MG/1
400 TABLET, FILM COATED ORAL
Qty: 42 | Refills: 0 | Status: ACTIVE | COMMUNITY
Start: 2022-06-02

## 2022-06-30 RX ORDER — METHOCARBAMOL 750 MG/1
750 TABLET, FILM COATED ORAL
Qty: 30 | Refills: 0 | Status: ACTIVE | COMMUNITY
Start: 2022-06-02

## 2022-06-30 RX ORDER — CYCLOBENZAPRINE HYDROCHLORIDE 10 MG/1
10 TABLET, FILM COATED ORAL
Qty: 15 | Refills: 0 | Status: ACTIVE | COMMUNITY
Start: 2022-01-26

## 2022-06-30 RX ORDER — NAPROXEN 500 MG/1
500 TABLET ORAL
Qty: 28 | Refills: 0 | Status: ACTIVE | COMMUNITY
Start: 2022-01-26

## 2022-06-30 NOTE — PHYSICAL EXAM
[General Appearance - Alert] : alert [General Appearance - In No Acute Distress] : in no acute distress [General Appearance - Well Nourished] : well nourished [Oriented To Time, Place, And Person] : oriented to person, place, and time [Impaired Insight] : insight and judgment were intact [Motor Strength] : muscle strength was normal in all four extremities [Sensation Tactile Decrease] : light touch was intact [Sensation Pain / Temperature Decrease] : pain and temperature was intact [Balance] : balance was intact [No Tenderness to Palpation] : no spine tenderness on palpation [No Visual Abnormalities] : no visible abnormailities [Antalgic] : antalgic [Able to toe walk] : the patient was able to toe walk [Able to heel walk] : the patient was able to heel walk [Sclera] : the sclera and conjunctiva were normal [Neck Appearance] : the appearance of the neck was normal [] : no respiratory distress [No Spinal Tenderness] : no spinal tenderness [Involuntary Movements] : no involuntary movements were seen [Motor Tone] : muscle strength and tone were normal

## 2022-06-30 NOTE — HISTORY OF PRESENT ILLNESS
[FreeTextEntry1] : MARCELLE CALDWELL is a 41 year old male who presents for neurosurgical evaluation of L2 and L3 transverse process fractures. He presented to [facility] ED after falling off a 12 foot ladder on Tuesday. States he was drilling a hole and slipped off the ladder & then landed on his side on top of it. Denies head strike, LOC, AC/AP use. Patient tried taking Tylenol at home for pain without relief, prompting him to seek medical attention. Patient states the pain is localized to his lower back, on the left side, radiating down the buttocks to the lateral thigh, associated with numbness, denies tingling or weakness. Also admits to L hip pain. Denies urinary/bowel incontinence or retention, saddle anaesthesia CT revealed L2/L3 transverse process fractures.\par Interval history: patient c/o LBP radiating to his Left LE with burning\par sensation. denies B/B incontinence.\par He states he is progressively getting better. The pain down his leg has subsided and now only feels slight back pain. He has not needed to take pain medications and he is able to sleep on his left side with no issue. \par

## 2022-06-30 NOTE — ASSESSMENT
[FreeTextEntry1] : Patient with above history and imaging showing left-sided transverse process fractures at L2 and L3. No neurological deficits. Pt has no insurance unable to do PT. Given pain management info. \par \par \par Plan:\par Pain management referral given\par Patient knows to call the office if there are any new or worsening symptoms. \par All questions and concerns answered and addressed in detail to patient's complete satisfaction. Patient verbalized understanding and agreed to plan.\par \par  \par \par

## 2022-06-30 NOTE — DATA REVIEWED
[de-identified] : EXAM: MR SPINE LUMBAR\par \par PROCEDURE DATE: 06/03/2022\par \par \par \par INTERPRETATION: [facility] RADIOLOGIST PRELIMINARY REPORT\par \par PROCEDURE INFORMATION:\par Exam: MR Lumbar Spine Without Contrast\par Exam date and time: 6/3/2022 8:05 PM\par Age: 41 years old\par Clinical indication: Low back pain\par \par TECHNIQUE:\par Imaging protocol: Multiplanar magnetic resonance images of the lumbar spine\par without intravenous contrast.\par \par COMPARISON:\par CT LUMBAR SPINE 6/2/2022 2:05 PM\par \par FINDINGS:\par Bones/joints: Unremarkable.\par Spinal cord: Normal signal. No cord compression.\par L1-L2: No significant disc disease. No significant spinal canal stenosis. No\par neural foraminal stenosis.\par L2-L3: No significant disc disease. No significant spinal canal stenosis. No\par neural foraminal stenosis.\par L3-L4: No significant disc disease. No significant spinal canal stenosis. No\par neural foraminal stenosis.\par L4-L5: Broad-based posterior disc bulge results in minimal canal narrowing and\par mild bilateral neural foraminal narrowing.\par L5-S1: Broad-base posterior disc bulge results in mild canal and moderate\par bilateral neural foraminal narrowing.\par Soft tissues: Unremarkable.\par \par IMPRESSION:\par Mild degenerative changes, most notable at L5-S1.\par \par Final report: Left-sided transverse process fractures at L2 and L3 are better identified on the prior CT CT obtained at 6/2/2022.\par \par No intraspinal hemorrhage

## 2023-03-09 ENCOUNTER — EMERGENCY (EMERGENCY)
Facility: HOSPITAL | Age: 43
LOS: 1 days | Discharge: HOME | End: 2023-03-09
Attending: EMERGENCY MEDICINE
Payer: SELF-PAY

## 2023-03-09 VITALS
OXYGEN SATURATION: 96 % | HEART RATE: 84 BPM | RESPIRATION RATE: 18 BRPM | DIASTOLIC BLOOD PRESSURE: 80 MMHG | TEMPERATURE: 98 F | SYSTOLIC BLOOD PRESSURE: 112 MMHG

## 2023-03-09 VITALS
OXYGEN SATURATION: 96 % | HEART RATE: 94 BPM | RESPIRATION RATE: 18 BRPM | WEIGHT: 225.09 LBS | TEMPERATURE: 99 F | SYSTOLIC BLOOD PRESSURE: 109 MMHG | DIASTOLIC BLOOD PRESSURE: 77 MMHG | HEIGHT: 67 IN

## 2023-03-09 LAB
ALBUMIN SERPL ELPH-MCNC: 4.7 G/DL — SIGNIFICANT CHANGE UP (ref 3.3–5.2)
ALP SERPL-CCNC: 114 U/L — SIGNIFICANT CHANGE UP (ref 40–120)
ALT FLD-CCNC: 324 U/L — HIGH
ANION GAP SERPL CALC-SCNC: 12 MMOL/L — SIGNIFICANT CHANGE UP (ref 5–17)
APTT BLD: 36.7 SEC — HIGH (ref 27.5–35.5)
AST SERPL-CCNC: 145 U/L — HIGH
BASOPHILS # BLD AUTO: 0.02 K/UL — SIGNIFICANT CHANGE UP (ref 0–0.2)
BASOPHILS NFR BLD AUTO: 0.4 % — SIGNIFICANT CHANGE UP (ref 0–2)
BILIRUB SERPL-MCNC: 0.7 MG/DL — SIGNIFICANT CHANGE UP (ref 0.4–2)
BUN SERPL-MCNC: 14.3 MG/DL — SIGNIFICANT CHANGE UP (ref 8–20)
CALCIUM SERPL-MCNC: 9.7 MG/DL — SIGNIFICANT CHANGE UP (ref 8.4–10.5)
CHLORIDE SERPL-SCNC: 98 MMOL/L — SIGNIFICANT CHANGE UP (ref 96–108)
CO2 SERPL-SCNC: 25 MMOL/L — SIGNIFICANT CHANGE UP (ref 22–29)
CREAT SERPL-MCNC: 1.26 MG/DL — SIGNIFICANT CHANGE UP (ref 0.5–1.3)
D DIMER BLD IA.RAPID-MCNC: 452 NG/ML DDU — HIGH
EGFR: 73 ML/MIN/1.73M2 — SIGNIFICANT CHANGE UP
EOSINOPHIL # BLD AUTO: 0.42 K/UL — SIGNIFICANT CHANGE UP (ref 0–0.5)
EOSINOPHIL NFR BLD AUTO: 8.3 % — HIGH (ref 0–6)
GLUCOSE SERPL-MCNC: 89 MG/DL — SIGNIFICANT CHANGE UP (ref 70–99)
HCT VFR BLD CALC: 44.5 % — SIGNIFICANT CHANGE UP (ref 39–50)
HGB BLD-MCNC: 15.6 G/DL — SIGNIFICANT CHANGE UP (ref 13–17)
IMM GRANULOCYTES NFR BLD AUTO: 0.2 % — SIGNIFICANT CHANGE UP (ref 0–0.9)
INR BLD: 1.2 RATIO — HIGH (ref 0.88–1.16)
LYMPHOCYTES # BLD AUTO: 0.92 K/UL — LOW (ref 1–3.3)
LYMPHOCYTES # BLD AUTO: 18.1 % — SIGNIFICANT CHANGE UP (ref 13–44)
MCHC RBC-ENTMCNC: 30.8 PG — SIGNIFICANT CHANGE UP (ref 27–34)
MCHC RBC-ENTMCNC: 35.1 GM/DL — SIGNIFICANT CHANGE UP (ref 32–36)
MCV RBC AUTO: 87.8 FL — SIGNIFICANT CHANGE UP (ref 80–100)
MONOCYTES # BLD AUTO: 0.64 K/UL — SIGNIFICANT CHANGE UP (ref 0–0.9)
MONOCYTES NFR BLD AUTO: 12.6 % — SIGNIFICANT CHANGE UP (ref 2–14)
NEUTROPHILS # BLD AUTO: 3.07 K/UL — SIGNIFICANT CHANGE UP (ref 1.8–7.4)
NEUTROPHILS NFR BLD AUTO: 60.4 % — SIGNIFICANT CHANGE UP (ref 43–77)
PLATELET # BLD AUTO: 208 K/UL — SIGNIFICANT CHANGE UP (ref 150–400)
POTASSIUM SERPL-MCNC: 4.2 MMOL/L — SIGNIFICANT CHANGE UP (ref 3.5–5.3)
POTASSIUM SERPL-SCNC: 4.2 MMOL/L — SIGNIFICANT CHANGE UP (ref 3.5–5.3)
PROT SERPL-MCNC: 8 G/DL — SIGNIFICANT CHANGE UP (ref 6.6–8.7)
PROTHROM AB SERPL-ACNC: 13.9 SEC — HIGH (ref 10.5–13.4)
RBC # BLD: 5.07 M/UL — SIGNIFICANT CHANGE UP (ref 4.2–5.8)
RBC # FLD: 12.7 % — SIGNIFICANT CHANGE UP (ref 10.3–14.5)
SODIUM SERPL-SCNC: 135 MMOL/L — SIGNIFICANT CHANGE UP (ref 135–145)
TROPONIN T SERPL-MCNC: <0.01 NG/ML — SIGNIFICANT CHANGE UP (ref 0–0.06)
WBC # BLD: 5.08 K/UL — SIGNIFICANT CHANGE UP (ref 3.8–10.5)
WBC # FLD AUTO: 5.08 K/UL — SIGNIFICANT CHANGE UP (ref 3.8–10.5)

## 2023-03-09 PROCEDURE — 82550 ASSAY OF CK (CPK): CPT

## 2023-03-09 PROCEDURE — 71045 X-RAY EXAM CHEST 1 VIEW: CPT

## 2023-03-09 PROCEDURE — 96374 THER/PROPH/DIAG INJ IV PUSH: CPT | Mod: XU

## 2023-03-09 PROCEDURE — 80053 COMPREHEN METABOLIC PANEL: CPT

## 2023-03-09 PROCEDURE — 96361 HYDRATE IV INFUSION ADD-ON: CPT

## 2023-03-09 PROCEDURE — 85025 COMPLETE CBC W/AUTO DIFF WBC: CPT

## 2023-03-09 PROCEDURE — 71045 X-RAY EXAM CHEST 1 VIEW: CPT | Mod: 26

## 2023-03-09 PROCEDURE — 99285 EMERGENCY DEPT VISIT HI MDM: CPT | Mod: 25

## 2023-03-09 PROCEDURE — 85730 THROMBOPLASTIN TIME PARTIAL: CPT

## 2023-03-09 PROCEDURE — 85379 FIBRIN DEGRADATION QUANT: CPT

## 2023-03-09 PROCEDURE — 93005 ELECTROCARDIOGRAM TRACING: CPT

## 2023-03-09 PROCEDURE — 99285 EMERGENCY DEPT VISIT HI MDM: CPT

## 2023-03-09 PROCEDURE — 36415 COLL VENOUS BLD VENIPUNCTURE: CPT

## 2023-03-09 PROCEDURE — 84484 ASSAY OF TROPONIN QUANT: CPT

## 2023-03-09 PROCEDURE — 93010 ELECTROCARDIOGRAM REPORT: CPT

## 2023-03-09 PROCEDURE — 71275 CT ANGIOGRAPHY CHEST: CPT | Mod: 26,MA

## 2023-03-09 PROCEDURE — 85610 PROTHROMBIN TIME: CPT

## 2023-03-09 PROCEDURE — 71275 CT ANGIOGRAPHY CHEST: CPT | Mod: MA

## 2023-03-09 RX ORDER — ACETAMINOPHEN 500 MG
975 TABLET ORAL ONCE
Refills: 0 | Status: COMPLETED | OUTPATIENT
Start: 2023-03-09 | End: 2023-03-09

## 2023-03-09 RX ORDER — METOCLOPRAMIDE HCL 10 MG
10 TABLET ORAL ONCE
Refills: 0 | Status: COMPLETED | OUTPATIENT
Start: 2023-03-09 | End: 2023-03-09

## 2023-03-09 RX ORDER — SODIUM CHLORIDE 9 MG/ML
1000 INJECTION INTRAMUSCULAR; INTRAVENOUS; SUBCUTANEOUS ONCE
Refills: 0 | Status: COMPLETED | OUTPATIENT
Start: 2023-03-09 | End: 2023-03-09

## 2023-03-09 RX ADMIN — Medication 975 MILLIGRAM(S): at 14:35

## 2023-03-09 RX ADMIN — Medication 10 MILLIGRAM(S): at 14:35

## 2023-03-09 RX ADMIN — SODIUM CHLORIDE 1000 MILLILITER(S): 9 INJECTION INTRAMUSCULAR; INTRAVENOUS; SUBCUTANEOUS at 14:36

## 2023-03-09 NOTE — ED ADULT NURSE NOTE - NSIMPLEMENTINTERV_GEN_ALL_ED
Implemented All Universal Safety Interventions:  [place] to call system. Call bell, personal items and telephone within reach. Instruct patient to call for assistance. Room bathroom lighting operational. Non-slip footwear when patient is off stretcher. Physically safe environment: no spills, clutter or unnecessary equipment. Stretcher in lowest position, wheels locked, appropriate side rails in place.

## 2023-03-09 NOTE — ED PROVIDER NOTE - PHYSICAL EXAMINATION
General: well appearing, NAD  Head:  NC, AT  Eyes: EOMI, PERRLA, no scleral icterus  Nose: midline, no bleeding/drainage  Cardiac: RRR, no m/r/g, no lower extremity edema  Respiratory: CTABL, no wheezes/rales/rhonchi, equal chest wall expansions  Abdomen: soft, ND, NT, no rebound tenderness, no guarding, nonperitonitic  MSK/Vascular: full ROM, distal pulses intact, soft compartments, warm extremities  Neuro: AAOx3, negative pronator drift, strength 5/5, sensation to light touch intact, finger to nose coordination intact, cranial nerves 2-12 intact  Psych: calm, cooperative, normal affect

## 2023-03-09 NOTE — ED PROVIDER NOTE - ATTENDING CONTRIBUTION TO CARE
PATRIC Paredes: see mdm     I have personally performed a face to face diagnostic evaluation on this patient.  I have reviewed the resident's note and agree with the history, exam, and plan of care, except as noted.   My medical decision making and observations are found above.

## 2023-03-09 NOTE — ED ADULT NURSE NOTE - OBJECTIVE STATEMENT
patient with headache x2 days, chest pain x a few weeks and bilateral thigh pain . pt recently traveled from [facility] 2 weeks ago, was put on medication for high cholesterol. pt took Tylenol which relieved it overnight but woke up this morning and still had a headache. patient denies fever, denies sick contacts.

## 2023-03-09 NOTE — ED PROVIDER NOTE - NS ED ROS FT
Constitutional: no fever, no sweats, and + chills.  CV: + chest pain, no edema.  Resp: no cough, no dyspnea  GI: no abdominal pain, no nausea and no vomiting.  MSK: + msk pain, no weakness  Skin: no lesions, and no rashes.  Neuro: no LOC, + headache, + dizziness  ROS otherwise negative except as noted in HPI.

## 2023-03-09 NOTE — ED PROVIDER NOTE - PROGRESS NOTE DETAILS
Aline: Troponin negative. CT Angio PE negative for PE. Discussed return precautions w patient. Urged Cardiology followup. Patient understood and is comfortable with the plan.

## 2023-03-09 NOTE — ED PROVIDER NOTE - ADDITIONAL NOTES AND INSTRUCTIONS:
Please excuse Rl Pond from work through 3/11/23. He was seen in the ER and treated for a medical condition.

## 2023-03-09 NOTE — ED PROVIDER NOTE - CLINICAL SUMMARY MEDICAL DECISION MAKING FREE TEXT BOX
43 yo male w recent travel presents w chest pain. Check labs, EKG, CXR. Monitor and reassess. 41 yo male w recent travel presents w chest pain. Check labs, EKG, CXR. Monitor and reassess.    DDX considered: stable angina, aortic dissection, pericarditis, pneumonia, pe, pts, chest wall pain, esophageal spasm and abdominal emergencies. I have discussed with patient with negative troponin and normal ekg their 28 day cardiac risk and they have agreed to outpt cardio f/ut his week without fail. asa per day until then. return to the ed immediately for any intractable chest pain or sob. pt agrees to plan of care

## 2023-03-09 NOTE — ED PROVIDER NOTE - PATIENT PORTAL LINK FT
You can access the FollowMyHealth Patient Portal offered by [facility] by registering at the following website: [email or web address]. By joining [employer]’s FollowMyHealth portal, you will also be able to view your health information using other applications (apps) compatible with our system.

## 2023-03-09 NOTE — ED PROVIDER NOTE - NSFOLLOWUPCLINICS_GEN_ALL_ED_FT
[facility] Cardiology  Cardiology  [address], [address]  [place] [address]  Phone: [phone]  Fax:   Follow Up Time: Urgent

## 2023-03-09 NOTE — ED PROVIDER NOTE - OBJECTIVE STATEMENT
Pt is a 41 yo male PMH hypercholesterolemia, presenting to ED c/o intermittent CP x 6 days and HA x 2 days. Pt states that they recently came [place] the [place] on a flight from [facility] (4.5 hr flight) on Thursday last week and began experiencing left sided intermittent CP on Friday. Pt states that the CP is worse when he takes a deep breath and can be relieved by putting pressure on the area of pain. Pt states that he has had muscle cramps today with b/l thigh and back pain as well as right arm cramping. Pt HA has associated dizziness. Pt positive chills and negative fever, SOB, n/v, changes in BM, recent sick contacts.

## 2023-03-09 NOTE — ED PROVIDER NOTE - NSFOLLOWUPINSTRUCTIONS_ED_ALL_ED_FT
Return to the ED should your symptoms worsen. Followup with your primary physician and Cardiologist.     Chest Pain    Chest pain can be caused by many different conditions which may or may not be dangerous. Causes include heartburn, lung infections, heart attack, blood clot in lungs, skin infections, strain or damage to muscle, cartilage, or bones, etc. In addition to a history and physical examination, an electrocardiogram (ECG) or other lab tests may have been performed to determine the cause of your chest pain. Follow up with your primary care provider or with a cardiologist as instructed.     SEEK IMMEDIATE MEDICAL CARE IF YOU HAVE ANY OF THE FOLLOWING SYMPTOMS: worsening chest pain, coughing up blood, unexplained back/neck/jaw pain, severe abdominal pain, dizziness or lightheadedness, fainting, shortness of breath, sweaty or clammy skin, vomiting, or racing heart beat. These symptoms may represent a serious problem that is an emergency. Do not wait to see if the symptoms will go away. Get medical help right away. Call 911 and do not drive yourself to the hospital.

## 2023-03-09 NOTE — ED ADULT TRIAGE NOTE - CHIEF COMPLAINT QUOTE
2nd attempt Lm for patient to contact the office, positive STI   pt c/o left side chest pain since Friday , pain is on & off, No SOB, + smoker  A&Ox3, resp wnl

## 2025-03-20 NOTE — ED PROVIDER NOTE - CARE PLAN
Detail Level: Simple Principal Discharge DX:	Chest pain   Render Risk Assessment In Note?: no Additional Notes: Pt on tapering prednisone course for PMR. Currently down to 5mg qday and psoriasis well controlled. Discussed possibility of rebound flare once prednisone is d/c’d. Pt has steroidal and non-steroidal topicals at home, will call if refills are needed. 1